# Patient Record
(demographics unavailable — no encounter records)

---

## 2024-11-13 NOTE — HISTORY OF PRESENT ILLNESS
[de-identified] : Follow up 11/13/24: Patient returns for follow up. No new neurologic symptoms. Here to review mri imaging.   Follow up 9/16/24: Patient presents for follow up. He continues to have severe low back pain that radiates to his left lower extremity. No relief with oral medications or home exercises. Has not yet completed MRI lumbar imaging.

## 2024-11-13 NOTE — DISCUSSION/SUMMARY
[de-identified] : Discussed my findings with the patient. I am recommending a consultation with Dr. Montoya to consider TEDDY, will see today. Patient will follow up with me in 2-3 months, sooner if there is an issue. All questions answered.

## 2024-11-13 NOTE — HISTORY OF PRESENT ILLNESS
[FreeTextEntry1] : Patient presents for initial evaluation of low back pain referral by Dr. Doherty from spine surgery from orthopedics.  Long history of low back pain with stenosis.  Has had injections in the past which have been helpful for him.  He is participated in physical therapy extensively without improvement.  Spine surgery team requesting epidural injections.   [Back] : back [8] : a current pain level of 8/10 [3] : an average pain level of 3/10 [10] : a maximum pain level of 10/10 [Sharp] : sharp [Burning] : burning [Shooting] : shooting [Electric] : electric [Left] : left [Lateral] : lateral aspect of the [Buttocks] : buttocks [Ankle] : ankle [Thigh] : thigh [Calf] : calf

## 2024-11-13 NOTE — DATA REVIEWED
[MRI] : MRI [FreeTextEntry1] :  PROCEDURE DATE:  11/11/2024    INTERPRETATION:  LUMBAR SPINE MRI  CLINICAL INFORMATION: Pain of 10 months duration following strain injury. No prior surgery.  TECHNIQUE: Multiplanar, multisequence MRI was obtained of the lumbar spine.  COMPARISON: None available.  FINDINGS:  OSSEOUS: Chronic healed mild to moderate anterior wedge compression fracture of the superior endplate of L1 without fragment retropulsion. Grade 1 anterolisthesis at L4-L5 and L5-S1 without pars interarticularis defects. Grade 1 retrolisthesis and grade 1 right lateral listhesis at L1-L2. Moderate levoscoliosis with apex at L2. No acute fracture or aggressive osseous neoplasm.  LEVEL BY LEVEL ANALYSIS:  T12-L1: Annular disc osteophyte complex and mild to moderate facet arthrosis contribute to mild to moderate spinal canal stenosis with right worse than left effacement of the lateral recesses and moderate right worse than left neural canal stenosis.  L1-L2: Annular disc osteophyte complex and mild to moderate facet arthrosis contribute to moderately severe spinal canal stenosis with left worse than right lateral recess stenosis and moderate to severe right worse than left neural canal stenosis.  L2-L3: Annular disc osteophyte complex and moderate facet arthrosis contribute to moderately severe spinal canal stenosis with right worse than left lateral recess stenosis and moderate bilateral neural canal stenosis.  L3-L4: Annular disc osteophyte complex and moderate facet arthrosis contribute to moderately severe spinal canal stenosis with left worse than right lateral recess stenosis and moderate left worse than right neural canal stenosis.  L4-L5: Broad-based posterior disc pseudobulge and severe facet arthrosis contribute to moderately severe spinal canal stenosis with left worse than right lateral recess stenosis and mild to moderate left worse than right neural canal stenosis.  L5-S1: Broad-based posterior disc signal bulge and moderate facet arthrosis contribute to mild spinal canal stenosis with partial effacement of the left lateral recess and mild to moderate left worse than right neural canal stenosis..  GENERAL: Conus medullaris tip is anatomic in positioning. No intradural or extradural lesion.  IMPRESSION: 1.  No recent lumbar fracture. Chronic healed mild to moderate anterior wedge compression fracture superior endplate of L1 without fragment retropulsion. 2.  Moderate lumbar levoscoliosis and advanced discogenic spondylosis contribute to moderately severe spinal canal stenosis from L1-L2 through L4-L5, as further detailed in the body section of this report. Multilevel 3.  Multilevel moderate to high-grade lumbar neural canal stenosis, most severe on the right side at L1-L2 with likely impingement of the exiting ipsilateral L1 nerve root.   ADDITIONAL CLINICAL INFORMATION: Low back muscle strain S35.986A  --- End of Report ---

## 2024-11-13 NOTE — PHYSICAL EXAM
[FreeTextEntry1] : Gen: A+O x 3 in NAD Psych: Normal mood and affect. Responds appropriately to commands Eyes: Anicteric. No discharge. EOMI. Resp: Breathing unlabored CV: Well Perfused Ext: No c/c/e  Skin: No lesions noted   Gait: Non antalgic, normal reciprocating heel to toe, able to stand on toes and heels. Tandem gait intact  Negative romberg   Stance: No Trendelenburg sign present with single leg stance     Neuro:   Tone: Normal. No clonus. Sensation: Grossly intact to light touch and pinprick bilateral lower limbs. Proprioception: Intact at big toes bilaterally. Reflexes: 2+  symmetric knee jerk, medial hamstring, ankle jerk. Plantars downgoing bilaterally.   MMT:   5/5 in b/l lower extremities      Spine:   Inspection: Alignment is midline, with no evidence of scoliosis. Iliac crest heights and PSIS heights level. No rib hump noted upon forward flexion of the trunk.    Palpation: There is + tenderness over the midline spinous processes, paravertebral muscles, PSIS,   Neg TTP over sciatic notch, greater trochanters bilaterally.     Lumbar ROM: Flexion, extension, side-bending, rotation, limited in most planes  pain with lateral flexion  pain with oblique extension  pain with lateral rotation      Hip ROM:   -Pain at terminal ROM bilaterally.   -FAIR, FABERE negative bilaterally.            -SLR positive on the left    -Crossed SLR  positive on the left   -Slump test  positive on the left    -Femoral Nerve Stretch test negative bilaterally   -Crossed Femoral Nerve test negative bilaterally        -Facet loading (Kemps Test) did not produce low back pain.   -Midline Sacral Thrust did not produce pain.      -Standing Flexion Test positive   -Seated Flexion Test negative   -SIJ pain not  elicited with dropping the involved leg off the table while the contralateral hip was held in flexion (Gaenslens Test)   - Negative Hip extension and ilium rotation (Yeomans Test)   - Negative Iliac Compression and Distraction of the ASIS   -Deepika Finger Test to SIJ Negative  on two attempts.

## 2024-11-13 NOTE — END OF VISIT
[FreeTextEntry3] :   This note was written by Ratna Varela PA-C, acting as a scribe for Dr. Ezra Doherty. I, Dr. Ezra Doherty, have read and attest that all the information, medical decision-making, and discharge instructions within are true and accurate.  I, Dr. Ezra Doherty, personally performed the evaluation and management (E/M) services for this new patient. That E/M includes conducting the initial examination, assessing all conditions, and establishing the plan of care. Today, my ACP, Ratna Varela PA-C, was here to observe my evaluation and management services for this patient to be followed going forward.

## 2024-11-13 NOTE — ASSESSMENT
[FreeTextEntry1] : Patient is referred by spine surgery for evaluation of radiculopathy in the left side.  Has CKD medication quite limited he is elderly not a good candidate for NSAIDs.  Not interested in neuropathic agents as felt extensive physical therapy.  I believe the next most reasonable step would be an epidural steroid injection.  Pain has been present for more than 3 months and is significantly interfering with the patient's ability to participate in ADLs and IADLs as well as enjoying a quality of life. Patient had tried and failed conservative treatment.  This includes but is not limited to: Acetaminophen, NSAIDs, muscle relaxants, neuropathic medications, physician directed home exercises and/or physical therapy for at least 8 weeks. After a thorough discussion of risks and benefits patient would like to proceed with left L5-S1 and S1 foramen transforaminal epidural steroid injection.    Risks and benefits of having injection discussed with patient. Risks discussed included, but not limited to: pain, infection, bleeding requiring emergency surgery, headaches, damage to vital organs, etc.    At this time, patient appears to be psychologically stable. Based on the history, physical exam and diagnostic findings, patient will benefit from this procedure. The goal of this procedure is to reduce pain and inflammation, improve function and range of motion and to further promote increased activity and return to previous level of functioning. The ultimate goal of performing this procedure is to improve patient's quality of life.    Asking for authorization for left L5-S1 and S1 foramen transforaminal epidural steroid injection to help treat patients pain.  Patient will be scheduled for this procedure.  Sandro Madison DO, FAAPMR Attending Physician, Interventional Pain Medicine  Department of Physical Medicine and Rehabilitation [place] [address] [address] [place] [address] [address] [place] [address] Email: [email or web address]  I have spent greater than 45 minutes preparing to see the patient, collecting relevant history, performing a thorough history and physical examination, counseling the patient regarding my findings ordering the appropriate therapies and tests, communicating with other relevant healthcare professionals, documenting my encounter and coordinating care.

## 2024-11-13 NOTE — PHYSICAL EXAM
[de-identified] : Physical Exam:  General: patient is well developed, well nourished, in no acute distress, alert and oriented x 3.  Mood and affect: normal  Respiratory: no respiratory distress noted  Skin: no scars over spine, skin intact, no erythema, increased warmth  Alignment: forward  Gait: slow  Palpation: no tenderness to palpation spine or paraspinal region  Range of motion: Lumbar spine ROM is restricted  Neurologic Exam: Motor: Manual Muscle testing in the lower extremities is 5 out of 5 in all muscle groups. There is no evidence of muscular atrophy in the lower extremities. Sensory: Sensation to light touch is grossly intact in the lower extremities  Reflexes: DTR are present and symmetric throughout, no clonus, plantar responses are flexor  Hip Exam: No pain with internal or external rotation of hips bilaterally  Special tests: Straight leg raise test negative. Cross straight leg test negative. GORAN test negative  Vascular: Examination of the peripheral vascular system demonstrates no evidence of congestion or edema. no evidence of lymphedema bilateral lower extremities, pulses are present and symmetric in both lower extremities. [de-identified] : MRI lumbar spine 11/11/24 (my read): chronic L1 compression fracture; moderate/severe stenosis L1/L2 through L4/L5  Xray 8/5/2024: full spine ap lat and flex ex: my read: full spine: compression fracture moderate to severe L1 with some kyphosis at this region, severe degenerative disc disease to the lumbar spine with associated lumbar scoliosis  MRI lumbar spine 3/11/22: L1 chronic compression fracture moderate, severe foraminal stenosis on the right at L1-2, L2-3 and L3-4, moderate stenosis at L4-5 in the central canal as well as moderate central canal stenosis at L3-4  DEXA 3/11/22: osteopenia per report  3/2/22XR full spine: compression fracture moderate to severe L1 with some kyphosis at this region, severe degenerative disc disease to the lumbar spine with associated lumbar scoliosis  XR lumbar lateral flexion extension: there is no significant dynamic instability however there is a grade 1 spondylolisthesis of L4-5  XR cervical flexion extension: extensive degenerative disc disease in the cervical subaxial spine, grade 1 spondylolisthesis C5-6 with no dynamic instability seen, no fractures present.

## 2025-04-02 NOTE — HISTORY OF PRESENT ILLNESS
[FreeTextEntry1] : f/u CKD last here 2023 no complaints except back issues  his lost weight past couple of years  labs done and reviewed - 1/13: cr 1.29, k 4.9, hco3 23, cam 9.7, ur alb 43 mg, vit D 68 Not checking BP at home but good at doctor visits PCP Dr Pierson

## 2025-04-02 NOTE — ASSESSMENT
[FreeTextEntry1] : CKD 3 stable BP well controlled  minimal microalbuminuria on ARB cont regimen 20 lbs weight loss noted past 6 mos- no clear cause - advised d/w PCP f/u 6-8 mos with labs

## 2025-04-28 NOTE — ASSESSMENT
[FreeTextEntry1] :  Procedure Note, [facility]  Preoperative Diagnosis: Left L5-S1 Radiculopathy  Postoperative Diagnosis: Left L5-S1 Radiculopathy  Procedure: 1.Left L5-S1 transforaminal epidural steroid injection. 2. Fluoroscopy for needle guidance. 3. Lumbar epidurography.  Surgeon/Staff: Sandro Madison DO Assistant: None  Anesthesia: Lidocaine 1%  Indications: The patient c/o radiating symptoms in a Left L5-S1 distribution. Patient's history and physical findings are consistent with Left L5-S1 lumbar radiculopathy. They are here for an injection at the site thought to be the source of their pain.  Procedure in detail: Written informed consent was obtained. Risks and benefits were explained to the patient including bleeding infection nerve damage and damage of adjacent structures. The chart was reviewed, questions were answered and the patient wished to proceed. The patient had no contraindications to the procedure. Vital signs were stable. Standard monitoring was applied. The patient, the procedure nurse, and the physician confirmed the site of injection after an official "time out."  Localization Time Out: An additional intraoperative timeout, specifically to confirm accurate localization, was conducted by the attending physician.  The patient remained awake and alert throughout the procedure. The patient was placed in the prone position. The skin was prepped with chlorhexidine and sterile drapes were applied. The skin and soft tissues were anesthetized with Lidocaine 1%.  A 22G 3.5 inch Quincke needle was inserted percutaneously and advanced under fluoroscopic guidance using dorsal oblique, AP, and lateral projections. The needle tip entered the neural foramen at the Left L5-S1 and S1 space to rest in the anterior epidural space. No reproduction of radicular symptoms down the leg reported by the patient on entry. Epidurography and radiographic interpretation: Epidurography was performed by injection of Omnipaque 300 under live fluoroscopy. Multiple Xray images were obtained. Radiologic examination confirmed proper spread of the contrast medium within the epidural space. There was no evidence of intrathecal or intravascular runoff. Images uploaded to PACS.  Test dose of 2 cc of 1% MPF lidocaine was administered to each level. Patient confirmed negative for ringing in the ears metallic taste in the mouth or numbness of the lips or tongue.  After negative test dosing with contrast and with MPF lidocaine 1.5 cc of betamethasone was injected at each level followed by an additional half cc of MPF lidocaine for a total of 3 cc of betamethasone 6 mg/cc and 3 cc of MPF lidocaine.  The patient was transferred to the recovery area. Post operative instructions were explained and given to the patient. The patient was discharged in stable health and given appropriate follow up.